# Patient Record
Sex: MALE | Race: WHITE | NOT HISPANIC OR LATINO | URBAN - NONMETROPOLITAN AREA
[De-identification: names, ages, dates, MRNs, and addresses within clinical notes are randomized per-mention and may not be internally consistent; named-entity substitution may affect disease eponyms.]

---

## 2023-06-20 ENCOUNTER — OFFICE VISIT (OUTPATIENT)
Dept: PRIMARY CARE | Facility: CLINIC | Age: 23
End: 2023-06-20
Payer: COMMERCIAL

## 2023-06-20 VITALS
SYSTOLIC BLOOD PRESSURE: 122 MMHG | OXYGEN SATURATION: 98 % | WEIGHT: 223 LBS | BODY MASS INDEX: 34.93 KG/M2 | DIASTOLIC BLOOD PRESSURE: 84 MMHG | HEART RATE: 74 BPM

## 2023-06-20 DIAGNOSIS — R22.0 FACIAL SWELLING: Primary | ICD-10-CM

## 2023-06-20 PROBLEM — J01.90 ACUTE SINUSITIS: Status: ACTIVE | Noted: 2023-06-20

## 2023-06-20 PROCEDURE — 99213 OFFICE O/P EST LOW 20 MIN: CPT | Performed by: FAMILY MEDICINE

## 2023-06-20 RX ORDER — AMOXICILLIN AND CLAVULANATE POTASSIUM 875; 125 MG/1; MG/1
875 TABLET, FILM COATED ORAL 2 TIMES DAILY
Qty: 20 TABLET | Refills: 0 | Status: SHIPPED | OUTPATIENT
Start: 2023-06-20 | End: 2023-06-30

## 2023-06-20 ASSESSMENT — ENCOUNTER SYMPTOMS
RHINORRHEA: 0
EYE PAIN: 0
SINUS PRESSURE: 1
EYE ITCHING: 1
FACIAL SWELLING: 1
EYE DISCHARGE: 0
HEADACHES: 1
EYE REDNESS: 0
SINUS PAIN: 0
PHOTOPHOBIA: 0

## 2023-06-20 NOTE — PROGRESS NOTES
Subjective   Patient ID: Jones Solano is a 23 y.o. male who presents for Facial Swelling (Left eye is swollen and red, pt says it hurts to blink ).  HPI  Woke up Friday with a swollen left eye   Constant pain with blinking  +itching   +intermittent HA on left side of head   +tired  Denies photophobia, blurry vision, diplopia   No discharge  No burning    Denies any injury  Wears glasses   Denies fever, chills, runny nose, cough, SOB, CP, N/V/D, weakness or dizziness       Review of Systems   HENT:  Positive for congestion, facial swelling and sinus pressure. Negative for dental problem, ear discharge, ear pain, hearing loss, mouth sores, nosebleeds, rhinorrhea and sinus pain.    Eyes:  Positive for itching. Negative for photophobia, pain, discharge, redness and visual disturbance.   Neurological:  Positive for headaches.   All other systems reviewed and are negative.      Objective   /84   Pulse 74   Wt 101 kg (223 lb)   SpO2 98%   BMI 34.93 kg/m²     Physical Exam  Constitutional:       Appearance: Normal appearance.   HENT:      Head: Normocephalic and atraumatic.      Comments: Left side has minimal swelling over maxillary and frontal sinuses w/o erythema, mild ttp/pressure w/ palp     Right Ear: Tympanic membrane, ear canal and external ear normal.      Left Ear: Tympanic membrane, ear canal and external ear normal.      Nose: Nose normal.      Mouth/Throat:      Mouth: Mucous membranes are moist.      Pharynx: Oropharynx is clear.   Eyes:      General: Lids are normal.      Extraocular Movements: Extraocular movements intact.      Conjunctiva/sclera: Conjunctivae normal.      Left eye: Left conjunctiva is not injected.      Pupils: Pupils are equal, round, and reactive to light.   Cardiovascular:      Rate and Rhythm: Normal rate and regular rhythm.      Pulses: Normal pulses.      Heart sounds: Normal heart sounds.   Pulmonary:      Effort: Pulmonary effort is normal.      Breath sounds: Normal  breath sounds.   Skin:     General: Skin is warm and dry.   Neurological:      General: No focal deficit present.      Mental Status: He is alert and oriented to person, place, and time.   Psychiatric:         Mood and Affect: Mood normal.         Behavior: Behavior normal.         Assessment/Plan   Problem List Items Addressed This Visit    None  Visit Diagnoses       Facial swelling    -  Primary    Relevant Medications    amoxicillin-pot clavulanate (Augmentin) 875-125 mg tablet           #left facial swelling:   -more sinusitis than perioribtal cellulitis  -augmentin

## 2023-12-27 ENCOUNTER — OFFICE VISIT (OUTPATIENT)
Dept: PRIMARY CARE | Facility: CLINIC | Age: 23
End: 2023-12-27
Payer: COMMERCIAL

## 2023-12-27 ENCOUNTER — ANCILLARY PROCEDURE (OUTPATIENT)
Dept: RADIOLOGY | Facility: CLINIC | Age: 23
End: 2023-12-27
Payer: COMMERCIAL

## 2023-12-27 VITALS
TEMPERATURE: 101 F | BODY MASS INDEX: 34.46 KG/M2 | OXYGEN SATURATION: 95 % | HEART RATE: 126 BPM | DIASTOLIC BLOOD PRESSURE: 80 MMHG | SYSTOLIC BLOOD PRESSURE: 122 MMHG | WEIGHT: 220 LBS

## 2023-12-27 DIAGNOSIS — R07.1 CHEST PAIN ON BREATHING: ICD-10-CM

## 2023-12-27 DIAGNOSIS — R50.9 FEVER, UNSPECIFIED FEVER CAUSE: ICD-10-CM

## 2023-12-27 DIAGNOSIS — J10.1 INFLUENZA A: Primary | ICD-10-CM

## 2023-12-27 DIAGNOSIS — R05.1 ACUTE COUGH: ICD-10-CM

## 2023-12-27 LAB
POC RAPID INFLUENZA A: POSITIVE
POC RAPID INFLUENZA B: NEGATIVE

## 2023-12-27 PROCEDURE — 87804 INFLUENZA ASSAY W/OPTIC: CPT | Performed by: FAMILY MEDICINE

## 2023-12-27 PROCEDURE — 1036F TOBACCO NON-USER: CPT | Performed by: FAMILY MEDICINE

## 2023-12-27 PROCEDURE — 71046 X-RAY EXAM CHEST 2 VIEWS: CPT

## 2023-12-27 PROCEDURE — 99214 OFFICE O/P EST MOD 30 MIN: CPT | Performed by: FAMILY MEDICINE

## 2023-12-27 PROCEDURE — 71046 X-RAY EXAM CHEST 2 VIEWS: CPT | Performed by: RADIOLOGY

## 2023-12-27 RX ORDER — OSELTAMIVIR PHOSPHATE 75 MG/1
75 CAPSULE ORAL 2 TIMES DAILY
Qty: 10 CAPSULE | Refills: 0 | Status: SHIPPED | OUTPATIENT
Start: 2023-12-27 | End: 2024-01-01

## 2023-12-27 NOTE — PROGRESS NOTES
Subjective   Patient ID: Jones Solano is a 23 y.o. male who presents for Headache and Cough (Fever,body aches, vomiting sick since Monday).    HPI   Onset Monday 2 PM  Congested sinuses  common   Onset chills aches  Sl fever Monday  Fever ever since coughing fits  Appetite poor  C/o otalgia  Not able to eat much  Able to take some liquids   Some discomfort left ml junction upper  Some diarrhea 2 x   Vomited 3-4 x   No flu vaccine   Works @ only few people   Review of Systems    Objective   /80   Pulse (!) 126   Temp 38.3 °C (101 °F)   Wt 99.8 kg (220 lb)   SpO2 95%   BMI 34.46 kg/m²     Physical Exam  General: alert, no apparent distress, good hygiene   HEAD:  Normocephalic, atraumatic    EARS:  EAC patent, TMs normal,   EYES:  sclera white, JO, conjunctiva noninjected  NOSE: Nasal passages patent   MOUTH: Pharynx clear, tongue uvula midline, grade 3 airway  Neck:  supple, no masses, thyroid non enlarged non nodular, no cervical adenopathy,  Lungs:  no wheezing, no rales , no rhonchi, normal respiratory pattern, breath sounds not diminished  Heart: tachy, regular rate and  rhythm, no murmur, no ectopy, no S3 or S4, no carotid bruits  Reviewed x-ray with patient present no obvious pneumonia/acute findings    Assessment/Plan   Problem List Items Addressed This Visit    None  Visit Diagnoses         Codes    Acute cough    -  Primary R05.1    Relevant Orders    XR chest 2 views    POCT Influenza A/B manually resulted (Completed)    Fever, unspecified fever cause     R50.9    Relevant Orders    XR chest 2 views    POCT Influenza A/B manually resulted (Completed)    Chest pain on breathing     R07.1    Relevant Orders    XR chest 2 views    POCT Influenza A/B manually resulted (Completed)    Influenza A     J10.1    Relevant Medications    oseltamivir (Tamiflu) 75 mg capsule        Patient with very severe case influenza A.  He is to follow-up if he is not feeling better in another 2 to 3 days or if  symptoms worsen.  Of note pulse ox slightly diminished compared to baseline.  Patient has not returned to work at least following Monday

## 2024-12-25 ENCOUNTER — HOSPITAL ENCOUNTER (EMERGENCY)
Facility: HOSPITAL | Age: 24
Discharge: HOME | End: 2024-12-25
Payer: COMMERCIAL

## 2024-12-25 VITALS
HEART RATE: 98 BPM | TEMPERATURE: 97.5 F | RESPIRATION RATE: 16 BRPM | OXYGEN SATURATION: 99 % | WEIGHT: 210 LBS | SYSTOLIC BLOOD PRESSURE: 132 MMHG | DIASTOLIC BLOOD PRESSURE: 80 MMHG | BODY MASS INDEX: 32.96 KG/M2 | HEIGHT: 67 IN

## 2024-12-25 DIAGNOSIS — J02.9 ACUTE PHARYNGITIS, UNSPECIFIED ETIOLOGY: Primary | ICD-10-CM

## 2024-12-25 PROCEDURE — 2500000004 HC RX 250 GENERAL PHARMACY W/ HCPCS (ALT 636 FOR OP/ED): Performed by: PHYSICIAN ASSISTANT

## 2024-12-25 PROCEDURE — 96372 THER/PROPH/DIAG INJ SC/IM: CPT | Performed by: PHYSICIAN ASSISTANT

## 2024-12-25 PROCEDURE — 99284 EMERGENCY DEPT VISIT MOD MDM: CPT

## 2024-12-25 ASSESSMENT — PAIN SCALES - GENERAL: PAINLEVEL_OUTOF10: 6

## 2024-12-25 ASSESSMENT — COLUMBIA-SUICIDE SEVERITY RATING SCALE - C-SSRS
1. IN THE PAST MONTH, HAVE YOU WISHED YOU WERE DEAD OR WISHED YOU COULD GO TO SLEEP AND NOT WAKE UP?: NO
6. HAVE YOU EVER DONE ANYTHING, STARTED TO DO ANYTHING, OR PREPARED TO DO ANYTHING TO END YOUR LIFE?: NO
2. HAVE YOU ACTUALLY HAD ANY THOUGHTS OF KILLING YOURSELF?: NO

## 2024-12-25 ASSESSMENT — LIFESTYLE VARIABLES
EVER HAD A DRINK FIRST THING IN THE MORNING TO STEADY YOUR NERVES TO GET RID OF A HANGOVER: NO
TOTAL SCORE: 0
HAVE PEOPLE ANNOYED YOU BY CRITICIZING YOUR DRINKING: NO
HAVE YOU EVER FELT YOU SHOULD CUT DOWN ON YOUR DRINKING: NO
EVER FELT BAD OR GUILTY ABOUT YOUR DRINKING: NO

## 2024-12-25 ASSESSMENT — PAIN - FUNCTIONAL ASSESSMENT: PAIN_FUNCTIONAL_ASSESSMENT: 0-10

## 2024-12-25 NOTE — ED PROVIDER NOTES
EMERGENCY MEDICINE EVALUATION NOTE    History of Present Illness     Chief Complaint:   Chief Complaint   Patient presents with    Sore Throat       HPI: Jones Solano is a 24 y.o. male presents with a chief complaint of sore throat.  Patient states been going since he woke up this morning.  Patient states that he feels like his uvula is slightly swollen.  He states that he feels that whenever he swallows he has a lot of pain.  Patient denies any fevers or chills.  He denies any other symptom such as rhinorrhea, ear pain.  Patient did not take anything home for symptoms.  Patient reports that he has no significant past medical history denies any medication allergies.    Previous History   No past medical history on file.  Past Surgical History:   Procedure Laterality Date    APPENDECTOMY  11/08/2013    Appendectomy    COLONOSCOPY  11/08/2013    Complete Colonoscopy    ESOPHAGOGASTRODUODENOSCOPY  11/08/2013    Diagnostic Esophagogastroduodenoscopy     Social History     Tobacco Use    Smoking status: Never    Smokeless tobacco: Never     No family history on file.  No Known Allergies  No current outpatient medications    Physical Exam     Appearance: Alert, oriented , cooperative     Skin: Intact,  dry skin, no lesions, rash, petechiae or purpura.      Eyes: PERRLA, EOMs intact,  Conjunctiva pink with no redness or exudates.      ENT: Hearing grossly intact. Pharynx erythema with slight tonsillar hypertrophy but no exudate, uvula midline but slightly erythematous.  No signs of PTA.     Neck: Supple. Trachea at midline.      Pulmonary: Clear bilaterally. No rales, rhonchi or wheezing. No accessory muscle use or stridor.     Cardiac: Normal rate and rhythm without murmur     Abdomen: Soft, nontender, active bowel sounds.     Musculoskeletal: Full range of motion.      Neurological:Cranial nerves II through XII are grossly intact, normal sensation, no weakness, no focal findings identified.     Results   Labs Reviewed  "- No data to display  No orders to display         ED Course & Medical Decision Making     Medications   dexAMETHasone (Decadron) injection 10 mg (10 mg intramuscular Given 12/25/24 1205)   penicillin G benzathine (Bicillin-LA) injection 1.2 Million Units (1.2 Million Units intramuscular Given 12/25/24 1205)     Heart Rate:  [98]   Temperature:  [36.4 °C (97.5 °F)]   Respirations:  [16]   BP: (132)/(80)   Height:  [170.2 cm (5' 7\")]   Weight:  [95.3 kg (210 lb)]   Pulse Ox:  [99 %]    ED Course as of 12/25/24 1323   Wed Dec 25, 2024   1147 Discussed Medicare with the patient.  Discussed Medicare going forward.  Patient was offered hospitalization just be treated.  Patient does appear to have swollen uvula slightly with some pharynx erythema no obvious tonsillar exudate.  Patient will be treated for bacterial pharyngitis.  Patient does not have any signs of any peritonsillar abscess.  He does have a slightly swollen/erythematous uvula but it is not obstructing in any way and it is midline.  Patient be given an IM shot of Decadron and Bicillin here.  Patient was encouraged to follow-up with primary care provider in 1 to 2 days or return here immediately with any worsening symptoms. [CJ]      ED Course User Index  [CJ] Kvng Prakash PA-C         Diagnoses as of 12/25/24 1323   Acute pharyngitis, unspecified etiology       Procedures   Procedures    Diagnosis     1. Acute pharyngitis, unspecified etiology        Disposition   Discharged    ED Prescriptions    None         Disclaimer: This note was dictated by speech recognition. Minor errors in transcription may be present. Please call if questions.       Kvng Prakash PA-C  12/25/24 1324    "

## 2025-01-07 ENCOUNTER — APPOINTMENT (OUTPATIENT)
Dept: PRIMARY CARE | Facility: CLINIC | Age: 25
End: 2025-01-07
Payer: COMMERCIAL